# Patient Record
Sex: FEMALE | Race: WHITE | HISPANIC OR LATINO | Employment: STUDENT | URBAN - METROPOLITAN AREA
[De-identification: names, ages, dates, MRNs, and addresses within clinical notes are randomized per-mention and may not be internally consistent; named-entity substitution may affect disease eponyms.]

---

## 2017-08-16 ENCOUNTER — HOSPITAL ENCOUNTER (OUTPATIENT)
Dept: MAMMOGRAPHY | Facility: CLINIC | Age: 22
Discharge: HOME | End: 2017-08-16
Attending: OBSTETRICS & GYNECOLOGY | Admitting: OBSTETRICS & GYNECOLOGY
Payer: COMMERCIAL

## 2017-08-16 DIAGNOSIS — N60.02 BREAST CYST, LEFT: ICD-10-CM

## 2017-08-16 DIAGNOSIS — N63.20 LUMP OF LEFT BREAST: ICD-10-CM

## 2017-08-16 PROCEDURE — 76642 ULTRASOUND BREAST LIMITED: CPT | Mod: LT

## 2017-08-17 ENCOUNTER — HOSPITAL ENCOUNTER (OUTPATIENT)
Dept: MAMMOGRAPHY | Facility: CLINIC | Age: 22
Discharge: HOME | End: 2017-08-17
Attending: OBSTETRICS & GYNECOLOGY | Admitting: OBSTETRICS & GYNECOLOGY
Payer: COMMERCIAL

## 2017-08-17 DIAGNOSIS — N63.20 LUMP OF LEFT BREAST: ICD-10-CM

## 2017-08-17 PROCEDURE — 25000125 ZZHC RX 250: Performed by: OBSTETRICS & GYNECOLOGY

## 2017-08-17 PROCEDURE — 88305 TISSUE EXAM BY PATHOLOGIST: CPT | Performed by: RADIOLOGY

## 2017-08-17 PROCEDURE — 27210206 US BREAST BIOPSY CORE NEEDLE LEFT

## 2017-08-17 PROCEDURE — 88305 TISSUE EXAM BY PATHOLOGIST: CPT | Mod: 26 | Performed by: RADIOLOGY

## 2017-08-17 RX ORDER — CETIRIZINE HYDROCHLORIDE 10 MG/1
10 TABLET ORAL DAILY
COMMUNITY

## 2017-08-17 RX ADMIN — LIDOCAINE HYDROCHLORIDE 5 ML: 10 INJECTION, SOLUTION INFILTRATION; PERINEURAL at 14:30

## 2017-08-17 NOTE — DISCHARGE INSTRUCTIONS

## 2017-08-18 ENCOUNTER — TELEPHONE (OUTPATIENT)
Dept: MAMMOGRAPHY | Facility: CLINIC | Age: 22
End: 2017-08-18

## 2017-08-18 LAB — COPATH REPORT: NORMAL

## 2017-08-21 NOTE — PROGRESS NOTES
After PATHOLOGY review by [facility] Radiologist, Dr. Isa Balderrama, Ms. 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  Biopsy site is without issues.  I encouraged her to perform monthly breast self exams and to contact her doctor with any further breast changes or concerns.

## 2019-02-14 ENCOUNTER — HOSPITAL ENCOUNTER (EMERGENCY)
Facility: CLINIC | Age: 24
Discharge: HOME | End: 2019-02-14
Attending: EMERGENCY MEDICINE | Admitting: EMERGENCY MEDICINE
Payer: COMMERCIAL

## 2019-02-14 VITALS
TEMPERATURE: 99.1 F | OXYGEN SATURATION: 97 % | WEIGHT: 167 LBS | SYSTOLIC BLOOD PRESSURE: 125 MMHG | DIASTOLIC BLOOD PRESSURE: 80 MMHG | HEIGHT: 65 IN | BODY MASS INDEX: 27.82 KG/M2
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
## 2019-02-14 DIAGNOSIS — S06.0X0A CONCUSSION WITHOUT LOSS OF CONSCIOUSNESS, INITIAL ENCOUNTER: ICD-10-CM

## 2019-02-14 PROCEDURE — 99283 EMERGENCY DEPT VISIT LOW MDM: CPT

## 2019-02-14 PROCEDURE — 25000131 ZZH RX MED GY IP 250 OP 636 PS 637: Performed by: EMERGENCY MEDICINE

## 2019-02-14 PROCEDURE — 25000132 ZZH RX MED GY IP 250 OP 250 PS 637: Performed by: EMERGENCY MEDICINE

## 2019-02-14 RX ORDER — ACETAMINOPHEN 325 MG/1
650 TABLET ORAL ONCE
Status: COMPLETED | OUTPATIENT
Start: 2019-02-14 | End: 2019-02-14

## 2019-02-14 RX ORDER — ONDANSETRON 4 MG/1
4 TABLET, ORALLY DISINTEGRATING ORAL ONCE
Status: COMPLETED | OUTPATIENT
Start: 2019-02-14 | End: 2019-02-14

## 2019-02-14 RX ORDER — ONDANSETRON 4 MG/1
4 TABLET, ORALLY DISINTEGRATING ORAL EVERY 8 HOURS PRN
Qty: 10 TABLET | Refills: 0 | Status: SHIPPED | OUTPATIENT
Start: 2019-02-14 | End: 2019-03-16

## 2019-02-14 RX ADMIN — ACETAMINOPHEN 650 MG: 325 TABLET, FILM COATED ORAL at 22:12

## 2019-02-14 RX ADMIN — ONDANSETRON 4 MG: 4 TABLET, ORALLY DISINTEGRATING ORAL at 22:12

## 2019-02-14 ASSESSMENT — ENCOUNTER SYMPTOMS
FATIGUE: 1
DIZZINESS: 0
WEAKNESS: 0
HEADACHES: 1
NAUSEA: 1
VOMITING: 0
NUMBNESS: 0

## 2019-02-14 ASSESSMENT — MIFFLIN-ST. JEOR: SCORE: 1513.39

## 2019-02-14 NOTE — ED AVS SNAPSHOT
Emergency Department  [address] [facility] [address]  Phone:  [phone]  Fax:  [phone]                                    Dahlia Nolan   MRN: [id number]    Department:   Emergency Department   Date of Visit:  2/14/2019           After Visit Summary Signature Page    I have received my discharge instructions, and my questions have been answered. I have discussed any challenges I see with this plan with the nurse or doctor.    ..........................................................................................................................................  Patient/Patient Representative Signature      ..........................................................................................................................................  Patient Representative Print Name and Relationship to Patient    ..................................................               ................................................  Date                                   Time    ..........................................................................................................................................  Reviewed by Signature/Title    ...................................................              ..............................................  Date                                               Time          22EPIC Rev 08/18

## 2019-02-14 NOTE — LETTER
February 14, 2019      To Whom It May Concern:      Dahlia Nolan was seen in our Emergency Department today, 02/14/19. She cannot do any school work or go to class on 2/15/19. She may return to school work and classes on 2/18/19.    Sincerely,        Sydney Marquez MD

## 2019-02-14 NOTE — LETTER
February 14, 2019      To Whom It May Concern:      Dahlia Nolan was seen in our Emergency Department today, 02/14/19. She cannot return to work until all symptoms are resolved.     Sincerely,        Sydney Marquez MD

## 2019-02-15 NOTE — ED PROVIDER NOTES
"  History     Chief Complaint:  Head Injury    The history is provided by the patient and a parent.      Dahlia Nolan is a 23 year old female who presents after a head injury. Roughly 2 hours prior to arrival she was skiing at [employer], where she works as a , when she ran into a tow rope at her ankles resulting in her falling face forward hitting her forehead and face. She notes that she remembers hearing the \"inside\" of her helmet cracking, however there was no external damage to the helmet. She does not believe she lost consciousness, but reports there is a very brief period of time that is hard to recall. There was another instructor skiing behind her who came up to her immediately. Father reports this instructor did not report unconsciousness. Patient immediately had epistaxis that resolved after a short period of time. She has since had a frontal headache (rated 3/10) with associated \"delayed\" sensation, fatigue, and intermittent nausea. She notes she felt \"woozy\" when walking and generally feels as if she is \"drunk and hung over at the same time.\" Her neck is sore but \"not painful\" and she has no persistent facial pain or other injuries. Patient reports due to her symptoms she did not feel comfortable driving and  recommended that she be evaluated prompting her to call her father who brought her to the ER. She denies dizziness, vomiting, double or blurred vision, numbness, tingling, or weakness.      Allergies:  No known drug allergies.       Medications:    Zyrtec  Lexapro  Probiotic Daily     Past Medical History:    Medical history reviewed. No pertinent medical history.      Past Surgical History:    Past surgical history reviewed. No pertinent past surgical history.     Family History:    Maternal grandmother: Breast Cancer     Social History:  The patient was accompanied to the ED by father.  Attends school  Works at [employer]   Marital Status:  Single      Review of Systems " "  Constitutional: Positive for fatigue.        Wooziness   HENT: Positive for nosebleeds (resolved). Negative for facial swelling.    Eyes: Negative for visual disturbance.   Gastrointestinal: Positive for nausea. Negative for vomiting.   Musculoskeletal: Negative for neck pain (\"sore but not painful\").   Skin: Negative for wound.   Neurological: Positive for headaches. Negative for dizziness, weakness and numbness (or tingling).   All other systems reviewed and are negative.    Physical Exam     Patient Vitals for the past 24 hrs:   BP Temp Temp src Heart Rate SpO2 Height Weight   02/14/19 2144 127/79 99.1  F (37.3  C) Temporal 73 97 % 1.651 m (5' 5\") 75.8 kg (167 lb)        Physical Exam  General: Well-developed and well-nourished. Well appearing young  woman. Cooperative.  Head:  Atraumatic. No hematoma, stepoffs, or deformities.   Eyes:  Conjunctivae, lids, and sclerae are normal.  ENT:    Dried blood in bilateral nares without septal hematoma. No deformity or significant ecchymosis. Moist mucous membranes. No raccoon's eyes or Pardo sign.  Neck:  Supple. Normal range of motion. No midline tenderness.  Resp:  No respiratory distress.   GI:  Non-distended.   MS:  Normal ROM. No evidence of trauma to thorax or extremities.   Skin:  Warm. Non-diaphoretic. No pallor.  Neuro: Awake. A&Ox3.     Strength 5/5 bilateral upper and lower extremities.    Sensation intact to light touch.    No facial droop. No dysarthria.    No aphasia.    PERRL.   Psych: Normal mood and affect. Normal speech.  Vitals reviewed.    Emergency Department Course     Interventions:  2212 Zofran 4 mg PO  2212 Tylenol 650 mg PO     Emergency Department Course:     Nursing notes and vitals reviewed.    2148 I performed an exam of the patient as documented above.     2238 I rechecked the patient, she passed PO challenge and is comfortable with discharge.    2239 I personally reviewed the exam results with the patient and answered all " "related questions prior to discharge.      [place] C-Spine Rule  (calculator)  Background  Assesses need for cervical spine imaging in acute trauma  Not validated in under age 16 years or GCS <15.  Data  23 year old  High Risk Criteria   Of 8 possible items  NEGATIVE  Low Risk Criteria   Of 5 possible items  Patient sitting up in emergency department  Patient ambulatory at any time after accident  Midline cervical spine pain absent    Interpretation  No indications for C-Spine imaging based on rule above.    [place] Head CT Rule  (calculator)  Background  Assesses need for head imaging in acute trauma  Only validated in adults with GCS 13-15 with witnessed LOC, amnesia to head injury or confusion  Data  23 year old  High Risk Criteria (major criteria)   Of 5 possible items  NEGATIVE    Moderate Risk Criteria (minor criteria)   Of 3 possible items  NEGATIVE  Interpretation  No indications for head imaging.    Impression & Plan      Medical Decision Making:  Dahlia is a 23 year old woman who had a mechanical fall while skiing causing her to fall forward and hit her head.  She was wearing a helmet and is unsure if she lost consciousness but does not believe she did.  Since this time she has had frontal headache, intermittent nausea without emesis, mild dizzy/foggy feeling, and fatigue.  Patient did have epistaxis immediately after the event but states that she has had no significant facial or nose pain.  She denies neck pain though states it is \"sore.\"  She has no other injuries or complaints.  She appears quite well on exam with no external evidence of trauma aside from dried blood in the nares without septal hematoma.  She has no focal neurologic deficits and I highly doubt an acute intracranial hemorrhage.  Patient symptoms are most consistent with concussion.  She does not require a CT of the head per [place] head CT rule.  She has no focal midline tenderness and does not require CT of the cervical spine per " "Lefor T-spine rule.  I had a very long discussion with the patient and the father regarding concussions and treatment strategy.  I recommended complete \"brain rest\" for 24 hours with return to the ADLs the following day but no return to any activities where she may reinjure her head, including skiing, until all symptoms have completely resolved.  I recommended symptomatic treatment in the interim including Tylenol as needed for headache with first dose here and Zofran as needed for nausea and vomiting with first dose here.  Patient was able to tolerate PO afterwards.  I recommended she follow-up with her primary care provider in the coming week to ensure she is improving as expected though I have invited the patient and her father to return with any new or worsening symptoms and we discussed concerning features of head injury at that would prompt return to the emergency department.  I answered all the patient and her father's questions and they verbalized understanding. Amenable to discharge.    Diagnosis:    ICD-10-CM    1. Concussion without loss of consciousness, initial encounter S06.0X0A    2. Activity involving downhill skiing Y93.23      Disposition:   The patient was discharged home.     Discharge Medications:       START taking      Dose / Directions   ondansetron 4 MG ODT tab  Commonly known as:  ZOFRAN ODT      Dose:  4 mg  Take 1 tablet (4 mg) by mouth every 8 hours as needed for nausea  Quantity:  10 tablet  Refills:  0           Where to get your medicines      Some of these will need a paper prescription and others can be bought over the counter. Ask your nurse if you have questions.    Bring a paper prescription for each of these medications    ondansetron 4 MG ODT tab         Scribe Disclosure:  I, Orla Severson, am serving as a scribe at 9:52 PM on 2/14/2019 to document services personally performed by Sydney Marquez MD based on my observations and the provider's statements to me.     [facility] " EMERGENCY DEPARTMENT       Sydney Marquez MD  02/18/19 8313

## 2019-02-15 NOTE — DISCHARGE INSTRUCTIONS
"At least 24 hours of \"brain rest.\" No screen time, reading, or activities.  After that rest, you can return to daily living, but you should not return to any activities where you may reinjure your head including skiing until ALL symptoms are completely resolved.  Return to the ER with severe headache, vision changes, numbness, weakness, seizure, altered mental status, or ANY OTHER CONCERNS.  Use Tylenol 650 mg every 4 hours as needed for headache.  Zofran as needed for nausea.  Continue wearing helmet when you return to skiing!!  " Returning 6/11/18 for 6 week post op - Noticed dark small \"string\" at entrance of vagina - slight vaginal discharge, no pain - staying within post op restrictions - no other problems  PLAN - Reassurance of normal post op expectations  [employer]

## 2019-02-18 ASSESSMENT — ENCOUNTER SYMPTOMS
WOUND: 0
FACIAL SWELLING: 0
NECK PAIN: 0

## 2024-09-01 ENCOUNTER — HOSPITAL ENCOUNTER (EMERGENCY)
Facility: CLINIC | Age: 29
Discharge: HOME | End: 2024-09-01
Attending: PHYSICIAN ASSISTANT | Admitting: PHYSICIAN ASSISTANT
Payer: COMMERCIAL

## 2024-09-01 VITALS
OXYGEN SATURATION: 94 % | SYSTOLIC BLOOD PRESSURE: 109 MMHG | HEART RATE: 80 BPM | RESPIRATION RATE: 18 BRPM | TEMPERATURE: 98.6 F | DIASTOLIC BLOOD PRESSURE: 86 MMHG

## 2024-09-01 DIAGNOSIS — S09.90XA CLOSED HEAD INJURY, INITIAL ENCOUNTER: ICD-10-CM

## 2024-09-01 DIAGNOSIS — S00.83XA FOREHEAD CONTUSION, INITIAL ENCOUNTER: ICD-10-CM

## 2024-09-01 PROCEDURE — 99283 EMERGENCY DEPT VISIT LOW MDM: CPT

## 2024-09-01 ASSESSMENT — ACTIVITIES OF DAILY LIVING (ADL)
ADLS_ACUITY_SCORE: 35

## 2024-09-01 ASSESSMENT — COLUMBIA-SUICIDE SEVERITY RATING SCALE - C-SSRS
2. HAVE YOU ACTUALLY HAD ANY THOUGHTS OF KILLING YOURSELF IN THE PAST MONTH?: NO
6. HAVE YOU EVER DONE ANYTHING, STARTED TO DO ANYTHING, OR PREPARED TO DO ANYTHING TO END YOUR LIFE?: NO
1. IN THE PAST MONTH, HAVE YOU WISHED YOU WERE DEAD OR WISHED YOU COULD GO TO SLEEP AND NOT WAKE UP?: NO

## 2024-09-01 NOTE — ED TRIAGE NOTES
Pt hit head against wooden doorframe this AM and has small bump on head.no LOC, not on thinners. Says she feels tired and has had some nausea. [place] still [place]      Triage Assessment (Adult)       Row Name 09/01/24 5822          Triage Assessment    Airway WDL WDL        Respiratory WDL    Respiratory WDL WDL        Skin Circulation/Temperature WDL    Skin Circulation/Temperature WDL WDL        Cardiac WDL    Cardiac WDL WDL        Peripheral/Neurovascular WDL    Peripheral Neurovascular WDL WDL        Cognitive/Neuro/Behavioral WDL    Cognitive/Neuro/Behavioral WDL WDL

## 2024-09-02 NOTE — DISCHARGE INSTRUCTIONS
Take it easy over the next few days, avoid activities that may put you at risk for repeat head injury. You may use Tylenol and ibuprofen for headache. Stay hydrated.

## 2024-12-21 ENCOUNTER — HEALTH MAINTENANCE LETTER (OUTPATIENT)
Age: 29
End: 2024-12-21